# Patient Record
(demographics unavailable — no encounter records)

---

## 2025-05-06 NOTE — PHYSICAL EXAM
[Soft] : abdomen soft [Non Tender] : non-tender [Normal Gait] : normal gait [Normal] : no edema, no cyanosis, no clubbing, no varicosities [Moves all extremities] : moves all extremities [Alert and Oriented] : alert and oriented [de-identified] : JVP 8 cm of H2O  [de-identified] : warm peripherally

## 2025-05-06 NOTE — DISCUSSION/SUMMARY
[FreeTextEntry1] : # Chronic systolic heart failure -Etiology: suspect hypertensive heart disease based on records/history. will obtain cardiac MRI (device is MRI conditional). will obtain genetic testing today. asked Dr. Hernandez for [facility] records  -GDMT: Current regimen is carvedilol 25 mg BID, hydralizine 100 mg TID, and imdur 60 mg daily. Labs pending today to determine ability to start ARNI/MRA/SGLT2i -Diuretics: Current regimen is lasix 40 mg daily, will continue  -Device: s/p SC-ICD, follows with Dr. Hernandez. of note has wide QRS but not quite LBBB, discussed with Dr. Hernandez and will consider CRT evaluation after medical optimization  -Labs: obtain today -Advanced therapies: if remains NYHA II-III with severe LV dysfunction at f/u visit in September will obtain RHC and CPET. may also benefit from CRT potentially -Last TTE 5/2025, will repeat 9/2025 on max GDMT   # Pulmonary hypertension - Suspect WHO II related to HF, assess non-invasively when not maximized on GDMT - Repeat TTE 9/2025 on max GDMT, eventual RHC   # Bicuspid aortic valve with dilated aorta - will obtain MRA at time of cardiac MRI for orthogonal measurements, defer CTA for now with abnormal GFR. should be able to assess AV morphology as well to confirm possible bicuspid aortic valve   # HTN - controlled with GDMT as above as well as amlodipine 10 mg daily   # CKD? - labs today pending  Return to clinic in 2 weeks with ACPs and subsequent ACP visits to follow. Return to clinic with me in 4 months with same day TTE

## 2025-05-06 NOTE — ASSESSMENT
[FreeTextEntry1] : 53 YO M with a history of ACC/AHA Stage C NICM (LV 6.9 cm, LVEF 25%) s/p SC-ICD, HTN, CKD (? Cr), and DM2 presenting to the HF/cardiomyopathy clinic for further evaluation.  Today he reports NYHA III symptoms and appears euvolemic on exam.

## 2025-05-06 NOTE — SOCIAL HISTORY
[TextEntry] : Lives in [facility] 5 children Living with wife and 2 children On disability

## 2025-05-06 NOTE — HISTORY OF PRESENT ILLNESS
[FreeTextEntry1] : Referring: Dr. Andres Hernandez  Mr. Javier is a 51 YO M with a history of ACC/AHA Stage C NICM (LV 6.9 cm, LVEF 25%) s/p SC-ICD, HTN, CKD (? Cr), and DM2 presenting to the HF/cardiomyopathy clinic for further evaluation.  ===============  With regards to his history, he states he was diagnosed with HTN in 2007 which may have been suboptimally controlled and ultimately was found to have severe LV dysfunction prompting implantation of a primary prevention ICD in 2011. He had done reasonably well for several years and denies any hospitalizations.  He began to develop worsening dyspnea on exertion in 2025 prompting referral to the HF/cardiomyopathy clinic  ===============  AT- declined over the course of the last year. Estimates his functional status to be declining and manages 1.5 blocks. And quickly disabled by stairs and inclined. He manages his ADLs independently. He will very rarely note LE edema with prolonged sitting. He denies CP, SOB at rest, orthopnea, PND, LH/dizziness, abdominal discomfort, palpitations, and syncope.

## 2025-05-06 NOTE — CARDIOLOGY SUMMARY
[de-identified] : EKG: SR with 1st degree AVB, IVCD in the LBBB with  ms, LAD [de-identified] : TTE 5/2025: LV 6.9 cm, LVEF 15-20% with global hypokinesis, LVOT VTI 12 cm, moderate-severe concentric LVH (1.6 cm), normal RV size/function, E/A 2.0 with e' velocities 4-8, severe biatrial enlargement dilated IVC, PASP 67 mmHg, possible bicuspid aortic valve with no AS and mild AI, dilated aorta measuring 5.0 cm  [de-identified] : LHC: per patient performed ~2020 and negative. inquired with Dr. Hernandez about results

## 2025-05-06 NOTE — PHYSICAL EXAM
[Soft] : abdomen soft [Non Tender] : non-tender [Normal Gait] : normal gait [Normal] : no edema, no cyanosis, no clubbing, no varicosities [Moves all extremities] : moves all extremities [Alert and Oriented] : alert and oriented [de-identified] : JVP 8 cm of H2O  [de-identified] : warm peripherally

## 2025-05-06 NOTE — CARDIOLOGY SUMMARY
[de-identified] : EKG: SR with 1st degree AVB, IVCD in the LBBB with  ms, LAD [de-identified] : TTE 5/2025: LV 6.9 cm, LVEF 15-20% with global hypokinesis, LVOT VTI 12 cm, moderate-severe concentric LVH (1.6 cm), normal RV size/function, E/A 2.0 with e' velocities 4-8, severe biatrial enlargement dilated IVC, PASP 67 mmHg, possible bicuspid aortic valve with no AS and mild AI, dilated aorta measuring 5.0 cm  [de-identified] : LHC: per patient performed ~2020 and negative. inquired with Dr. Hernandez about results

## 2025-05-06 NOTE — DISCUSSION/SUMMARY
[FreeTextEntry1] : # Chronic systolic heart failure -Etiology: suspect hypertensive heart disease based on records/history. will obtain cardiac MRI (device is MRI conditional). will obtain genetic testing today. asked Dr. Hernandez for [facility] records  -GDMT: Current regimen is carvedilol 25 mg BID, hydralizine 100 mg TID, and imdur 60 mg daily. Labs pending today to determine ability to start ARNI/MRA/SGLT2i -Diuretics: Current regimen is lasix 40 mg daily, will continue  -Device: s/p SC-ICD, follows with Dr. Hernandez. of note has wide QRS but not quite LBBB, discussed with Dr. Hernandez and will consider CRT evaluation after medical optimization  -Labs: obtain today -Advanced therapies: if remains NYHA II-III with severe LV dysfunction at f/u visit in September will obtain RHC and CPET. may also benefit from CRT potentially -Last TTE 5/2025, will repeat 9/2025 on max GDMT   # Pulmonary hypertension - Suspect WHO II related to HF, assess non-invasively when not maximized on GDMT - Repeat TTE 9/2025 on max GDMT, eventual RHC   # Bicuspid aortic valve with dilated aorta - will obtain MRA at time of cardiac MRI for orthogonal measurements, defer CTA for now with abnormal GFR. should be able to assess AV morphology as well to confirm possible bicuspid aortic valve   # HTN - controlled with GDMT as above as well as amlodipine 10 mg daily   # CKD? - labs today pending  Return to clinic in 2 weeks with ACPs and subsequent ACP visits to follow. Return to clinic with me in 4 months with same day TTE

## 2025-05-06 NOTE — SOCIAL HISTORY
[TextEntry] : Lives in [facility] 5 children Living with wife and 2 children On disability

## 2025-05-14 NOTE — HISTORY OF PRESENT ILLNESS
[FreeTextEntry1] : Patient is a 53 yo M with cardiomyopathy, CHF, ?CKD who presents for evaluation of CKD and fluid overload  AI generated note reviewed and edited where necessary:  53 yo M presenting with worsening shortness of breath over the past week, recently diagnosed with kidney failure, and a history of heart issues. - Chief Complaint (CC) : Shortness of breath worsening over the past week - History of Present Illness : The patient is a 52-year-old male with a history of an enlarged heart and heart murmur since birth. He reports feeling short of breath for the past week, which has been getting worse. He was recently diagnosed with kidney failure, which he believes started about a year or two ago. The patient has a history of hypertension since 2009, following ankle surgery. He has been on disability due to his health issues and previously worked moving heavy appliances. The patient denies any history of smoking, alcohol use, or drug use. He has been trying to manage his diet, avoiding salt and eating foods like broccoli and salmon. He reports being able to climb only two flights of stairs before becoming short of breath. - Past Medical History : Enlarged heart since birth, Heart murmur since birth, Hypertension diagnosed in 2009, Ankle surgery in 2007, Prediabetes (currently under control), Atrial fibrillation diagnosed in 2011, Chronic kidney disease diagnosed 1-2 years ago - Past Surgical History : Ankle surgery in 2007 - Family History : - Both parents have hypertension  - Mother recently had a blood clot in her leg and a hole in her intestines  - Brother had a stroke a year ago  - Social History : - Occupation: Currently on disability, previously worked moving heavy appliances  - Living Situation: Lives in [place] with girlfriend and son  - Marital Status: In a long-term relationship (18-19 years) but not legally   - Substance Use: Denies smoking, alcohol use, or drug use  - Review of Systems : - General : Reports fatigue and decreased energy levels - Neurological : No reported issues - Musculoskeletal : No reported issues - Cardiovascular : Reports shortness of breath, especially when climbing stairs - Respiratory : Shortness of breath, worsening over the past week - Gastrointestinal : No reported issues - Genitourinary : History of kidney issues - Integumentary : No reported issues - Psychiatric : Expresses concern and anxiety about his health conditions - Medications : - Jardiance 10mg  - Hydralazine 100mg three times a day  - Furosemide 20mg once daily (recently reduced from 40mg)  - Carvedilol 25mg twice daily (recently reduced from 50mg twice daily)  - Isosorbide 60mg once daily  - Vitamin D3  - Amlodipine 10mg  - Atorvastatin 80mg  - Allergies : No known medication allergies Objective: - Diagnostic Results : Recent genetic testing performed, results pending. Ultrasound of kidneys shows significant scarring bilaterally, with kidneys appearing small for the patient's height. No kidney stones or blockages observed. - Vital Signs : Blood pressure noted to be consistently high - Physical Examination (PE) : Physical examination reveals signs of fluid retention. Heart examination confirms previously known irregular heartbeat. Lungs clear to auscultation. Abdomen soft and non-tender. Lower extremities show mild edema.

## 2025-05-14 NOTE — HISTORY OF PRESENT ILLNESS
[FreeTextEntry1] : Patient is a 51 yo M with cardiomyopathy, CHF, ?CKD who presents for evaluation of CKD and fluid overload  AI generated note reviewed and edited where necessary:  51 yo M presenting with worsening shortness of breath over the past week, recently diagnosed with kidney failure, and a history of heart issues. - Chief Complaint (CC) : Shortness of breath worsening over the past week - History of Present Illness : The patient is a 52-year-old male with a history of an enlarged heart and heart murmur since birth. He reports feeling short of breath for the past week, which has been getting worse. He was recently diagnosed with kidney failure, which he believes started about a year or two ago. The patient has a history of hypertension since 2009, following ankle surgery. He has been on disability due to his health issues and previously worked moving heavy appliances. The patient denies any history of smoking, alcohol use, or drug use. He has been trying to manage his diet, avoiding salt and eating foods like broccoli and salmon. He reports being able to climb only two flights of stairs before becoming short of breath. - Past Medical History : Enlarged heart since birth, Heart murmur since birth, Hypertension diagnosed in 2009, Ankle surgery in 2007, Prediabetes (currently under control), Atrial fibrillation diagnosed in 2011, Chronic kidney disease diagnosed 1-2 years ago - Past Surgical History : Ankle surgery in 2007 - Family History : - Both parents have hypertension  - Mother recently had a blood clot in her leg and a hole in her intestines  - Brother had a stroke a year ago  - Social History : - Occupation: Currently on disability, previously worked moving heavy appliances  - Living Situation: Lives in [place] with girlfriend and son  - Marital Status: In a long-term relationship (18-19 years) but not legally   - Substance Use: Denies smoking, alcohol use, or drug use  - Review of Systems : - General : Reports fatigue and decreased energy levels - Neurological : No reported issues - Musculoskeletal : No reported issues - Cardiovascular : Reports shortness of breath, especially when climbing stairs - Respiratory : Shortness of breath, worsening over the past week - Gastrointestinal : No reported issues - Genitourinary : History of kidney issues - Integumentary : No reported issues - Psychiatric : Expresses concern and anxiety about his health conditions - Medications : - Jardiance 10mg  - Hydralazine 100mg three times a day  - Furosemide 20mg once daily (recently reduced from 40mg)  - Carvedilol 25mg twice daily (recently reduced from 50mg twice daily)  - Isosorbide 60mg once daily  - Vitamin D3  - Amlodipine 10mg  - Atorvastatin 80mg  - Allergies : No known medication allergies Objective: - Diagnostic Results : Recent genetic testing performed, results pending. Ultrasound of kidneys shows significant scarring bilaterally, with kidneys appearing small for the patient's height. No kidney stones or blockages observed. - Vital Signs : Blood pressure noted to be consistently high - Physical Examination (PE) : Physical examination reveals signs of fluid retention. Heart examination confirms previously known irregular heartbeat. Lungs clear to auscultation. Abdomen soft and non-tender. Lower extremities show mild edema.

## 2025-05-14 NOTE — ASSESSMENT
[FreeTextEntry1] : A Focused Ultrasound of the: [   ] Limited Chest [ X  ] Limited Echo [ X  ] Limited Retroperitoneal [   ] Limited Abdominal [   ] Limited Vascular   Indication: Heart failure, SOB, SEBASTIAN, Kidney failure   Acquisition: Bilateral short and long views of the kidneys obtained. PSAX PLAX apical subxiphoid views of heart obatined. VExUS performed The study was technically limited for the following reasons: None Findings: Heart extremely low EF, small pericardial effusion, poor LV movement, dilated cardiomyopathy, IVC dilated with minimal collapse. RV abraham well Bilateral kidneys increased echogenicity, small for heigh, no stones hydronephrosis or other lesions  VExUS portal and hepatic veins without congestion, renal vein without congestion Interpretation:   The following follow-up studies are indicated: None   Archival System: Images were saved to [employer]

## 2025-05-14 NOTE — ASSESSMENT
[FreeTextEntry1] : A Focused Ultrasound of the: [   ] Limited Chest [ X  ] Limited Echo [ X  ] Limited Retroperitoneal [   ] Limited Abdominal [   ] Limited Vascular   Indication: Heart failure, SOB, SEABSTIAN, Kidney failure   Acquisition: Bilateral short and long views of the kidneys obtained. PSAX PLAX apical subxiphoid views of heart obatined. VExUS performed The study was technically limited for the following reasons: None Findings: Heart extremely low EF, small pericardial effusion, poor LV movement, dilated cardiomyopathy, IVC dilated with minimal collapse. RV abraham well Bilateral kidneys increased echogenicity, small for heigh, no stones hydronephrosis or other lesions  VExUS portal and hepatic veins without congestion, renal vein without congestion Interpretation:   The following follow-up studies are indicated: None   Archival System: Images were saved to [employer]

## 2025-05-28 NOTE — HISTORY OF PRESENT ILLNESS
[FreeTextEntry1] : Referring: Dr. Andres Hernandez  Mr. Javier is a 53 YO M with a history of ACC/AHA Stage C NICM (LV 6.9 cm, LVEF 25%) s/p SC-ICD, HTN, CKD (? Cr), and DM2 presenting to the HF/cardiomyopathy clinic for further evaluation.  ===============  With regards to his history, he states he was diagnosed with HTN in 2007 which may have been suboptimally controlled and ultimately was found to have severe LV dysfunction prompting implantation of a primary prevention ICD in 2011. He had done reasonably well for several years and denies any hospitalizations.  He began to develop worsening dyspnea on exertion in 2025 prompting referral to the HF/cardiomyopathy clinic  =============== In the interval started on SGLT2i and reduced loos diuretics w/ several sets of repeat labs demonstrating stable unchanged eGFR ~20-23 w/ Cr 3.1-3.3.   Overall he is feeling better, will complete his cMRI on 6/11/2025 as this was rescheduled. He is not doing purposeful activity, his girlfriend and partner drove him today. He has not attempted to walk more than 1-2 blocks. And quickly disabled by stairs and inclined. He manages his ADLs independently. Amenable to cardiac rehab.   He will very rarely note LE edema with prolonged sitting. He denies CP, SOB at rest, orthopnea, PND, LH/dizziness, abdominal discomfort, palpitations, and syncope. Does not take BP at home but his girlfriend is a nurse.    
28-Nov-2020

## 2025-05-28 NOTE — PROCEDURE
[FreeTextEntry1] : evaluate BP control Placed ABPM cuff on left upper arm. Gave instructions for device function and proper fit. approx sleep period: 10p - 6a current BP meds: amlodipine 10mg daily, carvedilol 25mg BID, hydralazine 100mg TID, furosemide 20mg daily?

## 2025-05-28 NOTE — DISCUSSION/SUMMARY
[FreeTextEntry1] : # Chronic systolic heart failure -Etiology: suspect hypertensive heart disease based on records/history. will obtain cardiac MRI (device is MRI conditional on 6/11).  genetic testing negative. asked Dr. Hernandez for [facility] records  -GDMT: Current regimen is carvedilol 25 mg BID, hydralizine 100 mg TID, imdur 60 mg daily and Jardiance 10 mg daily. eGFR precludes the use of RAASi/ MRA. Today INCREASE Carvedilol to 50 mg BID (For weight >85 Kg). If SBP remain elevated will increase Imdur at subsequent visit.  -Diuretics: Current regimen is lasix 20 mg daily, will continue  -Device: s/p SC-ICD, follows with Dr. Hernandez. of note has wide QRS but not quite LBBB, discussed with Dr. Hernandez and will consider CRT evaluation after medical optimization  -Labs: 5/24/2025 [facility]: Na 139, K 4.1, BUN 32, Cl 104, CO2 27, Cr 3.12 (GFR 23), NTproBNP 1578-->1775 -Advanced therapies: if remains NYHA II-III with severe LV dysfunction at f/u visit in September will obtain RHC and CPET. may also benefit from CRT potentially. I have referred Mr. Javier to cardiac rehab in [place] pending pateint selection today 5/28/2025. -Last TTE 5/2025, will repeat 9/2025 on max GDMT   # Pulmonary hypertension - Suspect WHO II related to HF, assess non-invasively when not maximized on GDMT - Repeat TTE 9/2025 on max GDMT, eventual RHC   # Bicuspid aortic valve with dilated aorta - will obtain MRA at time of cardiac MRI for orthogonal measurements, defer CTA for now with abnormal GFR. should be able to assess AV morphology as well to confirm possible bicuspid aortic valve   # HTN - sub optimal controlled  -on GDMT as above as well as amlodipine 10 mg daily   # CKD Stage 4 - now on SGLT2i for dual cardiac and renal protection   Return to clinic in 3 weeks with ACPs (via telehealth with BP log). Return to clinic with Dr. Yi as scheduled in 9/2025 with same day TTE

## 2025-05-28 NOTE — SOCIAL HISTORY
[TextEntry] : Lives in [facility] 5 children Living with wife and 2 children On disability

## 2025-05-28 NOTE — ASSESSMENT
[FreeTextEntry1] : 51 YO M with a history of ACC/AHA Stage C NICM (LV 6.9 cm, LVEF 25%) s/p SC-ICD, HTN, CKD (? Cr), and DM2 presenting to the HF/cardiomyopathy clinic for further evaluation.  Today he reports NYHA III symptoms and appears euvolemic on exam. I suspect some of his functional status is impacted by deconditioning. I have recommended the following:

## 2025-05-28 NOTE — CARDIOLOGY SUMMARY
[de-identified] : EKG: SR with 1st degree AVB, IVCD in the LBBB with  ms, LAD [de-identified] : TTE 5/2025: LV 6.9 cm, LVEF 15-20% with global hypokinesis, LVOT VTI 12 cm, moderate-severe concentric LVH (1.6 cm), normal RV size/function, E/A 2.0 with e' velocities 4-8, severe biatrial enlargement dilated IVC, PASP 67 mmHg, possible bicuspid aortic valve with no AS and mild AI, dilated aorta measuring 5.0 cm  [de-identified] : LHC: per patient performed ~2020 and negative. inquired with Dr. Hernandez about results

## 2025-06-11 NOTE — HISTORY OF PRESENT ILLNESS
[de-identified] : 52 y.o. M with pmhx HTN, CM, CHF s/p ICD-D (SJM) who presents for MRI programming.   No device related complaints.

## 2025-06-11 NOTE — PHYSICAL EXAM
[General Appearance - Well Developed] : well developed [Normal Appearance] : normal appearance [Well Groomed] : well groomed [No Deformities] : no deformities [General Appearance - Well Nourished] : well nourished [General Appearance - In No Acute Distress] : no acute distress [Left Infraclavicular] : left infraclavicular area [Clean] : clean [Dry] : dry [Well-Healed] : well-healed

## 2025-06-11 NOTE — PROCEDURE
[No] : not [ICD] : Implantable cardioverter-defibrillator [VVI] : VVI [Lead Imp:  ___ohms] : lead impedance was [unfilled] ohms [Sensing Amplitude ___mv] : sensing amplitude was [unfilled] mv [___V @] : [unfilled] V [___ ms] : [unfilled] ms [Pace ___ %] : Pace [unfilled]% [de-identified] : KATHARINE [de-identified] : [id number] [de-identified] : [id number] [de-identified] : 40 [de-identified] : 7.9 years [de-identified] : NSVT 4/2025 No therapies No pacing